# Patient Record
Sex: FEMALE | Race: BLACK OR AFRICAN AMERICAN | URBAN - METROPOLITAN AREA
[De-identification: names, ages, dates, MRNs, and addresses within clinical notes are randomized per-mention and may not be internally consistent; named-entity substitution may affect disease eponyms.]

---

## 2018-09-13 ENCOUNTER — OFFICE VISIT (OUTPATIENT)
Dept: DERMATOLOGY | Facility: CLINIC | Age: 38
End: 2018-09-13
Payer: COMMERCIAL

## 2018-09-13 DIAGNOSIS — L30.0 NUMMULAR DERMATITIS: Primary | ICD-10-CM

## 2018-09-13 RX ORDER — TACROLIMUS 1 MG/G
OINTMENT TOPICAL 2 TIMES DAILY
Qty: 60 G | Refills: 0 | Status: SHIPPED | OUTPATIENT
Start: 2018-09-13 | End: 2018-12-06

## 2018-09-13 RX ORDER — FLUOCINONIDE 0.5 MG/G
OINTMENT TOPICAL 2 TIMES DAILY
Qty: 60 G | Refills: 3 | Status: SHIPPED | OUTPATIENT
Start: 2018-09-13 | End: 2018-12-06

## 2018-09-13 ASSESSMENT — PAIN SCALES - GENERAL: PAINLEVEL: NO PAIN (0)

## 2018-09-13 NOTE — PROGRESS NOTES
[facility] Dermatology Note      Dermatology Problem List:  1. Mild to moderate atopic dermatitis   - Fluocinonide 0.05% ointment twice daily PRN eczema flares   - Tacrolimus 0.1% ointment BID to face   - [place]ball emollient applied 2-3 times daily    Encounter Date: Sep 13, 2018    CC:   Chief Complaint   Patient presents with     Derm Problem     Laurie is visiting to discuss rash on arms and legs; This started a week ago.         History of Present Illness:  Ms. 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 Alvarez for red spots on the body. The spots are on both lower legs. She says they arose about two weeks ago.The first one was on the left leg, lateral aspect. Then the next three on the lower legs arose within one week. The primary symptom is itchiness during the night. During the day, they are not itchy. She did try one medication, hydrocortisone 1% cream, last week for a few days, but this did not improve the spot. She then noticed additional spots on her antecubital fossae, lower arms and face within the last week. She denies allergies or asthma. In her family, many relatives have asthma but no allergies or eczema. Of note, she says she has had these spots before many times. She says she can go years without any spots. Sometimes the spots can get blistery and weepy. She thinks she may have eczema but is not sure. She showers every day. She uses Dove unscented soap. She uses Vaseline after every shower to her entire body. She is not sure what laundry detergent she uses.    Past Medical History:   Eczema, longstanding.  She says she is otherwise healthy.    Social History:   reports that she has never smoked. She has never used smokeless tobacco.   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    Family History:  Family History   Problem Relation Age of Onset     Melanoma No family hx of      Skin Cancer No family hx of         Medications:  No current outpatient prescriptions on file.        No Known Allergies    Review of Systems:  -Skin/Heme New Pt: The patient denies frequent sun exposure. The patient denies excessive scarring or problems healing except as per HPI. The patient denies excessive bleeding.  -Constitutional: The patient denies fatigue, fevers, chills, unintended weight loss, and night sweats.  -HEENT: Patient denies nonhealing oral sores.  -Skin: As above in HPI. No additional skin concerns.    Physical exam:  Vitals: There were no vitals taken for this visit.  GEN: This is a well developed, well-nourished female in no acute distress, in a pleasant mood.    SKIN: Sun-exposed skin, which includes the head/face, neck, both arms, digits, and/or nails was examined.  -On the antecubital fossae, volar wrists, right popliteal fossa and anterior tibias, there are scattered eczematous patches and plaques with varying degrees of lichenification and scale.  -There is mild generalized xerosis of the entire body.  -There are subtle patches of erythema and scattered flesh colored papules on the cheeks and chin.  -Some scattered excoriations are visible on the arms and legs  -No other lesions of concern on areas examined.    Impression/Plan:  1. Scattered rash    The differential diagnosis includes atopic dermatitis versus psoriasis. The morphology of the lesions and their distribution is most consistent with atopic dermatitis. She is classified with mild to moderate disease involving less than five percent of the BSA. She does have some thick plaques, especially of the lower legs, and the hydrocortisone ointment she is using is not potent enough at these areas. We will recommend a stronger topical steroid to the worst and thickest plaques, as well as a steroid sparing agent for eczema on the face. We reviewed the pathogenesis of atopic dermatitis today as well and its chronic nature, which waxes and wanes.    Recommended  fluocinonide 0.05% ointment BID PRN eczema flares except do not use on face, underarms and groin    Recommended tacrolimus 0.1% ointment BID to the face    The adverse effects of skin atrophy and dyspigmentation were reviewed. She was instructed not to use the fluocinonide 0.05% ointment more than half the days in a month and to call the clinic if she felt she needed to use it more often, at which point, we can consider other options, including dupilumab.    She was provided with a hand out of gentle skin products that are recommended.    CC Dr. Alvarez on close of this encounter.  Follow-up in 3 months, earlier for new or changing lesions.     Dr. Davie Tilley staffed the patient.    Staff Involved:  Resident(Beto Graves)/Staff(as above)    Staff Physician Comments:   I saw and evaluated the patient with the resident and I agree with the assessment and plan with the following additions:   Rash on legs most consistent with nummular dermatitis, so will trial increased potency of steroid initially with fluocinonide 0.05% ointment. As improves, plan to downtitrate to triamcinolone 0.1% ointment. If tacrolimus is prohibitively expensive, would recommend low-potency topical steroid (class 6/7).    I was present for the examination..    Davie Tilley MD    Department of Dermatology

## 2018-09-13 NOTE — MR AVS SNAPSHOT
After Visit Summary   9/13/2018    Laurie Lorenzo, PhD    MRN: [id number]           Patient Information     Date Of Birth          1980        Visit Information        Provider Department      9/13/2018 8:30 AM Beto Graves MD [facility] Dermatology        Today's Diagnoses     Intrinsic atopic dermatitis    -  1      Care Instructions    The immune system gets more easily triggered. The skin is also prone to becoming dehydrated. This type of rash does not really cause this type of rash, which is called nummular dermatitis. It is hard to identify a trigger, but we can still treat it. Eczema waxes and wanes. When it flares, we hit it hard. When it is better, we can reduce the frequency of using the topical medications.     Use fluocinonide 0.5% ointment (Lidex) to the rough spots on the legs and arms.    For your face, use Protopic ointment, which is not a steroid and is safe to use on the face.    Gentle Skin Care  Below is a list of products our providers recommend for gentle skin care.  Moisturizers:    Lighter; Cetaphil Cream, CeraVe, Aveeno and Vanicream Light     Thicker; Aquaphor Ointment, Vaseline, Petrolium Jelly, Eucerin and Vanicream    Avoid Lotions (too thin)  Mild Cleansers:    Dove- Fragrance Free    CeraVe     Vanicream Cleansing Bar    Cetaphil Cleanser     Aquaphor 2 in1 Gentle Wash and Shampoo       Laundry Products:    All Free and Clear    Cheer Free    Generic Brands are okay as long as they are  Fragrance Free      Avoid fabric softeners  and dryer sheets   Sunscreens: SPF 30 or greater     Sunscreens that contain Zinc Oxide or Titanium Dioxide should be applied, these are physical blockers. Spray or  chemical  sunscreens should be avoided.        Shampoo and Conditioners:    Free and Clear by Vanicream    Aquaphor 2 in 1 Gentle Wash and Shampoo    California Baby  super sensitive   Oils:    Mineral Oil     Emu Oil     For some patients, coconut and sunflower seed oil       Generic Products are an okay substitute, but make sure they are fragrance free.  *Avoid product that have fragrance added to them. Organic does not mean  fragrance free.  In fact patients with sensitive skin can become quite irritated by organic products.     1. Daily bathing is recommended. Make sure you are applying a good moisturizer after bathing every time.  2. Use Moisturizing creams at least twice daily to the whole body. Your provider may recommend a lighter or heavier moisturizer based on your child s severity and that time of year it is.  3. Creams are more moisturizing than lotions  1. Products should be fragrance free- soaps, creams, detergents.            Follow-ups after your visit        Follow-up notes from your care team     Return in about 3 months (around 12/13/2018).      Your next 10 appointments already scheduled     Dec 06, 2018  8:00 AM CST   (Arrive by 7:45 AM)   Return Visit with Beto Graves MD   [facility] Dermat[facility] ([facility])    [address] [place] [id number] [phone]              Who to contact     Please call your clinic at [phone] to:    Ask questions about your health    Make or cancel appointments    Discuss your medicines    Learn about your test results    Speak to your doctor            Additional Information About Your Visit        MyChart Information     travelmobt is an electronic gateway that provides easy, online access to your medical records. With [employer], you can request a clinic appointment, read your test results, renew a prescription or communicate with your care team.     To sign up for travelmobt visit the website at [email or web address]   You will be asked to enter the access code listed below, as well as some personal information. Please follow the directions to create your username and password.     Your access code is: [email or web address]  Expires: 12/11/2018  6:30 AM     Your access code will   in 90 days. If you need help or a new code, please contact your [facility] or call [phone] for assistance.        Care EveryWhere ID     This is your Care EveryWhere ID. This could be used by other organizations to access your [place] medical records  [id number]         Blood Pressure from Last 3 Encounters:   No data found for BP    Weight from Last 3 Encounters:   No data found for Wt              Today, you had the following     No orders found for display         Today's Medication Changes          These changes are accurate as of 18  9:22 AM.  If you have any questions, ask your nurse or doctor.               Start taking these medicines.        Dose/Directions    fluocinonide 0.05 % ointment   Commonly known as:  LIDEX   Used for:  Intrinsic atopic dermatitis   Started by:  Beto Graves MD        Apply topically 2 times daily When having eczema flares on the body EXCEPT the face, underarms and groin   Quantity:  60 g   Refills:  3       tacrolimus 0.1 % ointment   Commonly known as:  PROTOPIC   Used for:  Intrinsic atopic dermatitis   Started by:  Beto Graves MD        Apply topically 2 times daily To the face, underarms and groin when having an eczema flare   Quantity:  60 g   Refills:  0            Where to get your medicines      These medications were sent to [facility] [address] [address]    Hours:  TRANSPLANT PHONE NUMBER [phone] Phone:  [phone]     fluocinonide 0.05 % ointment    tacrolimus 0.1 % ointment                Primary Care Provider    None Specified       No primary provider on file.        Equal Access to Services     YUDITH JACKSON : Sergio Correa, wadestiny dillon, qaybta kaalminna tolbert. So [facility] [phone].    ATENCIÓN: Si habla español, tiene a holland disposición  servicios gratuitos de asistencia lingüística. Tad bean [phone].    We comply with applicable federal civil rights laws and [place] laws. We do not discriminate on the basis of race, color, national origin, age, disability, sex, sexual orientation, or gender identity.            Thank you!     Thank you for choosing [facility] DERMATOLOGY  for your care. Our goal is always to provide you with excellent care. Hearing back from our patients is one way we can continue to improve our services. Please take a few minutes to complete the written survey that you may receive in the mail after your visit with us. Thank you!             Your Updated Medication List - Protect others around you: Learn how to safely use, store and throw away your medicines at www.disposemymeds.org.          This list is accurate as of 9/13/18  9:22 AM.  Always use your most recent med list.                   Brand Name Dispense Instructions for use Diagnosis    fluocinonide 0.05 % ointment    LIDEX    60 g    Apply topically 2 times daily When having eczema flares on the body EXCEPT the face, underarms and groin    Intrinsic atopic dermatitis       tacrolimus 0.1 % ointment    PROTOPIC    60 g    Apply topically 2 times daily To the face, underarms and groin when having an eczema flare    Intrinsic atopic dermatitis

## 2018-09-13 NOTE — PATIENT INSTRUCTIONS
The immune system gets more easily triggered. The skin is also prone to becoming dehydrated. This type of rash does not really cause this type of rash, which is called nummular dermatitis. It is hard to identify a trigger, but we can still treat it. Eczema waxes and wanes. When it flares, we hit it hard. When it is better, we can reduce the frequency of using the topical medications.     Use fluocinonide 0.5% ointment (Lidex) to the rough spots on the legs and arms.    For your face, use Protopic ointment, which is not a steroid and is safe to use on the face.    Gentle Skin Care  Below is a list of products our providers recommend for gentle skin care.  Moisturizers:    Lighter; Cetaphil Cream, CeraVe, Aveeno and Vanicream Light     Thicker; Aquaphor Ointment, Vaseline, Petrolium Jelly, Eucerin and Vanicream    Avoid Lotions (too thin)  Mild Cleansers:    Dove- Fragrance Free    CeraVe     Vanicream Cleansing Bar    Cetaphil Cleanser     Aquaphor 2 in1 Gentle Wash and Shampoo       Laundry Products:    All Free and Clear    Cheer Free    Generic Brands are okay as long as they are  Fragrance Free      Avoid fabric softeners  and dryer sheets   Sunscreens: SPF 30 or greater     Sunscreens that contain Zinc Oxide or Titanium Dioxide should be applied, these are physical blockers. Spray or  chemical  sunscreens should be avoided.        Shampoo and Conditioners:    Free and Clear by Vanicream    Aquaphor 2 in 1 Gentle Wash and Shampoo    California Baby  super sensitive   Oils:    Mineral Oil     Emu Oil     For some patients, coconut and sunflower seed oil      Generic Products are an okay substitute, but make sure they are fragrance free.  *Avoid product that have fragrance added to them. Organic does not mean  fragrance free.  In fact patients with sensitive skin can become quite irritated by organic products.     1. Daily bathing is recommended. Make sure you are applying a good moisturizer after bathing every  time.  2. Use Moisturizing creams at least twice daily to the whole body. Your provider may recommend a lighter or heavier moisturizer based on your child s severity and that time of year it is.  3. Creams are more moisturizing than lotions  1. Products should be fragrance free- soaps, creams, detergents.

## 2018-09-13 NOTE — NURSING NOTE
Dermatology Rooming Note    Laurie Lorenzo, PhD's goals for this visit include:   Chief Complaint   Patient presents with     Derm Problem     Laurie is visiting to discuss rash on arms and legs; This started a week ago.     Shahla Rosenthal LPN

## 2018-09-13 NOTE — LETTER
9/13/2018       RE: Laurie Lorenzo, PhD  [address] [place] [address]     Dear Colleague,    Thank you for referring your patient, Laurie Maura, PhD, to the [facility]MAT[facility]. Please see a copy of my visit note below.    [facility] Dermatology Note      Dermatology Problem List:  1. Mild to moderate atopic dermatitis   - Fluocinonide 0.05% ointment twice daily PRN eczema flares   - Tacrolimus 0.1% ointment BID to face   - [place] emollient applied 2-3 times daily    Encounter Date: Sep 13, 2018    CC:   Chief Complaint   Patient presents with     Derm Problem     Laurie is visiting to discuss rash on arms and legs; This started a week ago.         History of Present Illness:  Ms. 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 Alvarez for red spots on the body. The spots are on both lower legs. She says they arose about two weeks ago.The first one was on the left leg, lateral aspect. Then the next three on the lower legs arose within one week. The primary symptom is itchiness during the night. During the day, they are not itchy. She did try one medication, hydrocortisone 1% cream, last week for a few days, but this did not improve the spot. She then noticed additional spots on her antecubital fossae, lower arms and face within the last week. She denies allergies or asthma. In her family, many relatives have asthma but no allergies or eczema. Of note, she says she has had these spots before many times. She says she can go years without any spots. Sometimes the spots can get blistery and weepy. She thinks she may have eczema but is not sure. She showers every day. She uses Dove unscented soap. She uses Vaseline after every shower to her entire body. She is not sure what laundry detergent she uses.    Past Medical History:   Eczema, longstanding.  She says she is otherwise healthy.    Social  History:   reports that she has never smoked. She has never used smokeless tobacco.   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    Family History:  Family History   Problem Relation Age of Onset     Melanoma No family hx of      Skin Cancer No family hx of        Medications:  No current outpatient prescriptions on file.        No Known Allergies    Review of Systems:  -Skin/Heme New Pt: The patient denies frequent sun exposure. The patient denies excessive scarring or problems healing except as per HPI. The patient denies excessive bleeding.  -Constitutional: The patient denies fatigue, fevers, chills, unintended weight loss, and night sweats.  -HEENT: Patient denies nonhealing oral sores.  -Skin: As above in HPI. No additional skin concerns.    Physical exam:  Vitals: There were no vitals taken for this visit.  GEN: This is a well developed, well-nourished female in no acute distress, in a pleasant mood.    SKIN: Sun-exposed skin, which includes the head/face, neck, both arms, digits, and/or nails was examined.  -On the antecubital fossae, volar wrists, right popliteal fossa and anterior tibias, there are scattered eczematous patches and plaques with varying degrees of lichenification and scale.  -There is mild generalized xerosis of the entire body.  -There are subtle patches of erythema and scattered flesh colored papules on the cheeks and chin.  -Some scattered excoriations are visible on the arms and legs  -No other lesions of concern on areas examined.    Impression/Plan:  1. Scattered rash    The differential diagnosis includes atopic dermatitis versus psoriasis. The morphology of the lesions and their distribution is most consistent with atopic dermatitis. She is classified with mild to moderate disease involving less than five percent of the BSA. She does have some thick plaques, especially of the lower legs, and the hydrocortisone ointment she is using is not potent enough at  these areas. We will recommend a stronger topical steroid to the worst and thickest plaques, as well as a steroid sparing agent for eczema on the face. We reviewed the pathogenesis of atopic dermatitis today as well and its chronic nature, which waxes and wanes.    Recommended fluocinonide 0.05% ointment BID PRN eczema flares except do not use on face, underarms and groin    Recommended tacrolimus 0.1% ointment BID to the face    The adverse effects of skin atrophy and dyspigmentation were reviewed. She was instructed not to use the fluocinonide 0.05% ointment more than half the days in a month and to call the clinic if she felt she needed to use it more often, at which point, we can consider other options, including dupilumab.    She was provided with a hand out of gentle skin products that are recommended.    CC Dr. Alvarez on close of this encounter.  Follow-up in 3 months, earlier for new or changing lesions.     Dr. Davie Tilley staffed the patient.    Staff Involved:  Resident(Beto Graves)/Staff(as above)    Staff Physician Comments:   I saw and evaluated the patient with the resident and I agree with the assessment and plan with the following additions:   Rash on legs most consistent with nummular dermatitis, so will trial increased potency of steroid initially with fluocinonide 0.05% ointment. As improves, plan to downtitrate to triamcinolone 0.1% ointment. If tacrolimus is prohibitively expensive, would recommend low-potency topical steroid (class 6/7).    I was present for the examination..    Davie Tilley MD    Department of Dermatology      Beto Graves MD

## 2018-09-14 PROBLEM — L30.0 NUMMULAR DERMATITIS: Status: ACTIVE | Noted: 2018-09-14

## 2018-09-14 RX ORDER — TRIAMCINOLONE ACETONIDE 1 MG/G
OINTMENT TOPICAL 2 TIMES DAILY
Qty: 80 G | Refills: 3 | Status: SHIPPED | OUTPATIENT
Start: 2018-09-14 | End: 2018-12-06

## 2018-09-18 ENCOUNTER — TELEPHONE (OUTPATIENT)
Dept: DERMATOLOGY | Facility: CLINIC | Age: 38
End: 2018-09-18

## 2018-09-18 NOTE — TELEPHONE ENCOUNTER
I attempted to call the patient again today at [phone] but was not able to reach her. Then I verified by calling her pharmacy that the medications we sent were filled by the patient, including Lidex, Protopic and Kenalog. I spoke to the pharmacist, who told me that he read our clinic note in Psychiatric and instructed the patient accordingly to use the Kenalog 0.1% ointment twice daily for eczema patches as maintenance therapy after using the stronger Lidex 0.05% ointment twice daily for the thickest eczema lesions. The patient filled the prescriptions today, 9/18/18.

## 2018-12-06 ENCOUNTER — OFFICE VISIT (OUTPATIENT)
Dept: DERMATOLOGY | Facility: CLINIC | Age: 38
End: 2018-12-06
Payer: COMMERCIAL

## 2018-12-06 DIAGNOSIS — L30.0 NUMMULAR DERMATITIS: Primary | ICD-10-CM

## 2018-12-06 RX ORDER — FLUOCINONIDE 0.5 MG/G
OINTMENT TOPICAL 2 TIMES DAILY
Qty: 120 G | Refills: 3 | Status: SHIPPED | OUTPATIENT
Start: 2018-12-06

## 2018-12-06 RX ORDER — TRIAMCINOLONE ACETONIDE 1 MG/G
OINTMENT TOPICAL 2 TIMES DAILY
Qty: 160 G | Refills: 6 | Status: SHIPPED | OUTPATIENT
Start: 2018-12-06

## 2018-12-06 RX ORDER — TACROLIMUS 1 MG/G
OINTMENT TOPICAL 2 TIMES DAILY
Qty: 120 G | Refills: 3 | Status: SHIPPED | OUTPATIENT
Start: 2018-12-06

## 2018-12-06 ASSESSMENT — PAIN SCALES - GENERAL: PAINLEVEL: NO PAIN (0)

## 2018-12-06 NOTE — PROGRESS NOTES
[facility] Dermatology Note      Dermatology Problem List:    [facility] patient of Dr. Solitario Graves  1. Mild to moderate atopic dermatitis   - Fluocinonide 0.05% ointment twice daily PRN eczema flares   - Triamcinolone 0.1% ointment BID PRN mild to moderate eczema   - Tacrolimus 0.1% ointment BID to face   - [place] emollient applied 2-3 times daily    Encounter Date: Dec 6, 2018    CC:   Chief Complaint   Patient presents with     Derm Problem     Laurie is here for rash on legs.         History of Present Illness:  Ms. 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  At the last appointment, she was recommended to use fluocinonide 0.05% ointment twice daily for her active flare, triamcinolone 0.1% ointment for thinner patches and bland emollient 2-3 times daily.  She says that she use the fluocinonide ointment for about a month, then transitioned to triamcinolone ointment, then stopped using any topical steroids and has been on a break for at least 1 month due to improvement in her rash.  She continues to use a bland emollient daily.  She says that her nummular dermatitis has improved significantly on the aforementioned regimen.  Of note, she is moving to [facility] and is requesting refills of medications to hold her over until she establishes care with a dermatologist there.    Past Medical History:   Eczema, longstanding.  She says she is otherwise healthy.    Social History:   reports that she has never smoked. She has never used smokeless tobacco.   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    Family History:  Family History   Problem Relation Age of Onset     Melanoma No family hx of      Skin Cancer No family hx of        Medications:  Current Outpatient Prescriptions   Medication Sig Dispense Refill     fluocinonide (LIDEX) 0.05 % external ointment Apply topically 2 times daily When having  eczema flares on the body EXCEPT the face, underarms and groin 120 g 3     tacrolimus (PROTOPIC) 0.1 % external ointment Apply topically 2 times daily To the face, underarms and groin when having an eczema flare 120 g 3     triamcinolone (KENALOG) 0.1 % external ointment Apply topically 2 times daily 160 g 6        No Known Allergies    Review of Systems:  -Skin/Heme New Pt: The patient denies frequent sun exposure. The patient denies excessive scarring or problems healing except as per HPI. The patient denies excessive bleeding.  -Constitutional: The patient denies fatigue, fevers, chills, unintended weight loss, and night sweats.  -HEENT: Patient denies nonhealing oral sores.  -Skin: As above in HPI. No additional skin concerns.    Physical exam:  Vitals: There were no vitals taken for this visit.  GEN: This is a well developed, well-nourished female in no acute distress, in a pleasant mood.    SKIN: Sun-exposed skin, which includes the head/face, neck, both arms, digits, and/or nails was examined.  - No rash visualized on face, trunk or arms  - At the calves, there are a few scattered hyperpigmented, thin and scaly plaques, consistent with mild nummular dermatitis.  -No other lesions of concern on areas examined.    Impression/Plan:  1. Nummular dermatitis    She does have a few persistent eczematous lesions of the calves in the setting of not using topical steroids over the last month.  It would be reasonable to continue applying either fluocinonide 0.05% ointment or triamcinolone 0.1% ointment to the spots for a 2-3 more weeks.  The patient was told that fluocinonide is stronger than triamcinolone, so she would have to apply this medication for fewer days.  We reviewed again the signs of topical steroid overuse.    Given that she is moving to [facility] at the end of the month, we refilled her prescriptions.    Reordered fluocinonide 0.05% ointment BID PRN eczema flares except do not use on face,  underarms and groin    Reordered tacrolimus 0.1% ointment BID to the face    We ordered triamcinolone 0.1% ointment twice daily as needed for mild to moderate eczema    CC Dr. Alvarez on close of this encounter.  Follow-up in 3 months, earlier for new or changing lesions.     Dr. Davie Tilley staffed the patient.    Staff Involved:  Resident/Staff    Staff Physician Comments:   I saw and evaluated the patient with the resident and I edited the assessment and plan as documented in the note. I was present for the examination.    Davie Tilley MD   of Dermatology  Department of Dermatology  [facility]

## 2018-12-06 NOTE — MR AVS SNAPSHOT
After Visit Summary   2018    Laurie Lorenzo, PhD    MRN: [id number]           Patient Information     Date Of Birth          1980        Visit Information        Provider Department      2018 8:00 AM Beto Graves MD [facility] Dermatology        Today's Diagnoses     Nummular dermatitis          Care Instructions    Please continue to use the current regimen given your good response. It is okay to continue using either the fluocinonide or triamcinolone for a few more weeks on the eczema spots on your legs. The fluocinonide is stronger and you may not need to use it as long.    Your medications were refilled today with double-quantities.    Otherwise, good luck in [facility] and let us know if we can help in any way.    Only use tacrolimus on face, groin and underarms and not other medications.          Follow-ups after your visit        Follow-up notes from your care team     Return if symptoms worsen or fail to improve.      Who to contact     Please call your clinic at [phone] to:    Ask questions about your health    Make or cancel appointments    Discuss your medicines    Learn about your test results    Speak to your doctor            Additional Information About Your Visit        [employer] Information     [employer] is an electronic gateway that provides easy, online access to your medical records. With [employer], you can request a clinic appointment, read your test results, renew a prescription or communicate with your care team.     To sign up for [employer] visit the website at [email or web address]   You will be asked to enter the access code listed below, as well as some personal information. Please follow the directions to create your username and password.     Your access code is: [email or web address]  Expires: 2018  5:30 AM     Your access code will  in 90 days. If you need help or a new code, please contact your [facility] or call  [phone] for assistance.        Care EveryWhere ID     This is your Care EveryWhere ID. This could be used by other organizations to access your [facility] medical records  [id number]         Blood Pressure from Last 3 Encounters:   No data found for BP    Weight from Last 3 Encounters:   No data found for Wt              Today, you had the following     No orders found for display         Where to get your medicines      These medications were sent to [place], [place] - [address]  [address], [facility] [address]    Hours:  TRANSPLANT PHONE NUMBER [phone] Phone:  [phone]     fluocinonide 0.05 % external ointment    tacrolimus 0.1 % external ointment    triamcinolone 0.1 % external ointment          Primary Care Provider    None Specified       No primary provider on file.        Equal Access to Services     YUDITH JACKSON : Sergio Correa, wadestiny dillon, qaalec kaalcliff nicholas, minna shipman . So [facility] [phone].    ATENCIÓN: Si habla español, tiene a holland disposición servicios gratuitos de asistencia lingüística. Tad al [phone].    We comply with applicable federal civil rights laws and [place] laws. We do not discriminate on the basis of race, color, national origin, age, disability, sex, sexual orientation, or gender identity.            Thank you!     Thank you for choosing [facility] DERMATOLOGY  for your care. Our goal is always to provide you with excellent care. Hearing back from our patients is one way we can continue to improve our services. Please take a few minutes to complete the written survey that you may receive in the mail after your visit with us. Thank you!             Your Updated Medication List - Protect others around you: Learn how to safely use, store and throw away your medicines at www.disposemymeds.org.          This list is accurate as of 12/6/18  8:30  AM.  Always use your most recent med list.                   Brand Name Dispense Instructions for use Diagnosis    fluocinonide 0.05 % external ointment    LIDEX    120 g    Apply topically 2 times daily When having eczema flares on the body EXCEPT the face, underarms and groin    Nummular dermatitis       tacrolimus 0.1 % external ointment    PROTOPIC    120 g    Apply topically 2 times daily To the face, underarms and groin when having an eczema flare    Nummular dermatitis       triamcinolone 0.1 % external ointment    KENALOG    160 g    Apply topically 2 times daily    Nummular dermatitis

## 2018-12-06 NOTE — NURSING NOTE
Dermatology Rooming Note    Laurie Lorenzo, PhD's goals for this visit include:   Chief Complaint   Patient presents with     Derm Problem     Laurie is here for rash on legs.     Karla Kim, CMA

## 2018-12-06 NOTE — LETTER
12/6/2018       RE: Laurie Lorenzo, PhD  [address] [place] [address]     Dear Colleague,    Thank you for referring your patient, Laurie Maura, PhD, to the [facility] DERMATOLOGY at [facility]. Please see a copy of my visit note below.    [facility] Dermatology Note      Dermatology Problem List:    [facility] of Dr. Solitario Graves  1. Mild to moderate atopic dermatitis   - Fluocinonide 0.05% ointment twice daily PRN eczema flares   - Triamcinolone 0.1% ointment BID PRN mild to moderate eczema   - Tacrolimus 0.1% ointment BID to face   - [place] emollient applied 2-3 times daily    Encounter Date: Dec 6, 2018    CC:   Chief Complaint   Patient presents with     Derm Problem     Laurie is here for rash on legs.         History of Present Illness:  Ms. 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  At the last appointment, she was recommended to use fluocinonide 0.05% ointment twice daily for her active flare, triamcinolone 0.1% ointment for thinner patches and bland emollient 2-3 times daily.  She says that she use the fluocinonide ointment for about a month, then transitioned to triamcinolone ointment, then stopped using any topical steroids and has been on a break for at least 1 month due to improvement in her rash.  She continues to use a bland emollient daily.  She says that her nummular dermatitis has improved significantly on the aforementioned regimen.  Of note, she is moving to [facility] and is requesting refills of medications to hold her over until she establishes care with a dermatologist there.    Past Medical History:   Eczema, longstanding.  She says she is otherwise healthy.    Social History:   reports that she has never smoked. She has never used smokeless tobacco.   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    Family  History:  Family History   Problem Relation Age of Onset     Melanoma No family hx of      Skin Cancer No family hx of        Medications:  Current Outpatient Prescriptions   Medication Sig Dispense Refill     fluocinonide (LIDEX) 0.05 % external ointment Apply topically 2 times daily When having eczema flares on the body EXCEPT the face, underarms and groin 120 g 3     tacrolimus (PROTOPIC) 0.1 % external ointment Apply topically 2 times daily To the face, underarms and groin when having an eczema flare 120 g 3     triamcinolone (KENALOG) 0.1 % external ointment Apply topically 2 times daily 160 g 6        No Known Allergies    Review of Systems:  -Skin/Heme New Pt: The patient denies frequent sun exposure. The patient denies excessive scarring or problems healing except as per HPI. The patient denies excessive bleeding.  -Constitutional: The patient denies fatigue, fevers, chills, unintended weight loss, and night sweats.  -HEENT: Patient denies nonhealing oral sores.  -Skin: As above in HPI. No additional skin concerns.    Physical exam:  Vitals: There were no vitals taken for this visit.  GEN: This is a well developed, well-nourished female in no acute distress, in a pleasant mood.    SKIN: Sun-exposed skin, which includes the head/face, neck, both arms, digits, and/or nails was examined.  - No rash visualized on face, trunk or arms  - At the calves, there are a few scattered hyperpigmented, thin and scaly plaques, consistent with mild nummular dermatitis.  -No other lesions of concern on areas examined.    Impression/Plan:  1. Nummular dermatitis    She does have a few persistent eczematous lesions of the calves in the setting of not using topical steroids over the last month.  It would be reasonable to continue applying either fluocinonide 0.05% ointment or triamcinolone 0.1% ointment to the spots for a 2-3 more weeks.  The patient was told that fluocinonide is stronger than triamcinolone, so she would have to  apply this medication for fewer days.  We reviewed again the signs of topical steroid overuse.    Given that she is moving to [facility] at the end of the month, we refilled her prescriptions.    Reordered fluocinonide 0.05% ointment BID PRN eczema flares except do not use on face, underarms and groin    Reordered tacrolimus 0.1% ointment BID to the face    We ordered triamcinolone 0.1% ointment twice daily as needed for mild to moderate eczema    CC Dr. Alvarez on close of this encounter.  Follow-up in 3 months, earlier for new or changing lesions.     Dr. Davie Tilley staffed the patient.    Staff Involved:  Resident/Staff    Staff Physician Comments:   I saw and evaluated the patient with the resident and I edited the assessment and plan as documented in the note. I was present for the examination.    Davie Tilley MD   of Dermatology  Department of Dermatology  [facility] [employer]      Beto Graves MD

## 2018-12-06 NOTE — PATIENT INSTRUCTIONS
Please continue to use the current regimen given your good response. It is okay to continue using either the fluocinonide or triamcinolone for a few more weeks on the eczema spots on your legs. The fluocinonide is stronger and you may not need to use it as long.    Your medications were refilled today with double-quantities.    Otherwise, good luck in [facility] and let us know if we can help in any way.    Only use tacrolimus on face, groin and underarms and not other medications.